# Patient Record
Sex: FEMALE | Race: WHITE | NOT HISPANIC OR LATINO | URBAN - METROPOLITAN AREA
[De-identification: names, ages, dates, MRNs, and addresses within clinical notes are randomized per-mention and may not be internally consistent; named-entity substitution may affect disease eponyms.]

---

## 2023-09-13 ENCOUNTER — TELEPHONE (OUTPATIENT)
Dept: HEMATOLOGY/ONCOLOGY | Facility: CLINIC | Age: 58
End: 2023-09-13
Payer: OTHER GOVERNMENT

## 2023-09-13 NOTE — TELEPHONE ENCOUNTER
----- Message from Jesus Wharton RN sent at 9/12/2023  9:31 AM CDT -----  Regarding: FW: pt called    ----- Message -----  From: Belkis Georges  Sent: 9/12/2023   9:26 AM CDT  To: #  Subject: pt called                                        Name of Who is Calling: ABELARDO PAIZ [[id number]]      What is the request in detail:  . She has had her  gall bladder removed and is interested in a specialist that does the treatments for cancer . Patient is seeking a 2nd opinion. Please advise       Can the clinic reply by [facility]: No       What [facility] Call Back [facility]: Telephone Information:  M    [phone]